# Patient Record
Sex: FEMALE | Race: WHITE | URBAN - METROPOLITAN AREA
[De-identification: names, ages, dates, MRNs, and addresses within clinical notes are randomized per-mention and may not be internally consistent; named-entity substitution may affect disease eponyms.]

---

## 2019-05-26 ENCOUNTER — EMERGENCY (EMERGENCY)
Facility: HOSPITAL | Age: 39
LOS: 1 days | End: 2019-05-26
Admitting: EMERGENCY MEDICINE
Payer: SELF-PAY

## 2019-05-26 PROCEDURE — 99284 EMERGENCY DEPT VISIT MOD MDM: CPT | Mod: 25

## 2019-05-26 PROCEDURE — 99053 MED SERV 10PM-8AM 24 HR FAC: CPT

## 2019-05-27 PROCEDURE — 76815 OB US LIMITED FETUS(S): CPT | Mod: 26

## 2019-05-27 PROCEDURE — 76817 TRANSVAGINAL US OBSTETRIC: CPT | Mod: 26

## 2020-05-20 PROBLEM — Z00.00 ENCOUNTER FOR PREVENTIVE HEALTH EXAMINATION: Status: ACTIVE | Noted: 2020-05-20

## 2020-05-21 ENCOUNTER — APPOINTMENT (OUTPATIENT)
Dept: ORTHOPEDIC SURGERY | Facility: CLINIC | Age: 40
End: 2020-05-21
Payer: MEDICAID

## 2020-05-21 VITALS — WEIGHT: 122 LBS | HEIGHT: 59.45 IN | BODY MASS INDEX: 24.27 KG/M2

## 2020-05-21 VITALS — DIASTOLIC BLOOD PRESSURE: 72 MMHG | SYSTOLIC BLOOD PRESSURE: 104 MMHG | HEART RATE: 74 BPM

## 2020-05-21 DIAGNOSIS — Z78.9 OTHER SPECIFIED HEALTH STATUS: ICD-10-CM

## 2020-05-21 PROCEDURE — 99204 OFFICE O/P NEW MOD 45 MIN: CPT

## 2020-05-21 NOTE — PHYSICAL EXAM
[de-identified] : - Constitutional: This is a female in no obvious distress.  \par - Psych: Patient is alert and oriented to person, place and time.  Patient has a normal mood and affect.\par - Cardiovascular: Normal pulses throughout the upper extremities.  No significant varicosities are noted in the upper extremities. \par - Neuro: Strength and sensation are intact throughout the upper extremities.  Patient has normal coordination.\par - Respiratory:  Patient exhibits no evidence of shortness of breath or difficulty breathing.\par - Skin: No rashes, lesions, or other abnormalities are noted in the upper extremities.\par \par ---\par \par Examination of her right wrist demonstrates swelling and tenderness along the first dorsal compartment.  There is evidence of depigmentation and atrophy consistent with her prior cortisone injection.  She has a positive Finkelstein sign.  There is no tenderness along the CMC joint.  She is neurovascularly intact distally.

## 2020-05-21 NOTE — CONSULT LETTER
[Dear  ___] : Dear  [unfilled], [Please see my note below.] : Please see my note below. [Consult Letter:] : I had the pleasure of evaluating your patient, [unfilled]. [Consult Closing:] : Thank you very much for allowing me to participate in the care of this patient.  If you have any questions, please do not hesitate to contact me. [Sincerely,] : Sincerely, [FreeTextEntry1] : Luis Enrique Lentz M.D.\par Surgery of the Hand & Upper Extremity\par Orthopaedic Surgery\par Chief, Hand Service, [facility]\par Director, Hand Service, [facility]\par  of Orthopedic Surgery, [employer] [facility] \par [facility]\par \par Email: [email or web address]\par Office Phone: [phone]

## 2020-05-21 NOTE — ADDENDUM
[FreeTextEntry1] : I, Jb Medrano, acted solely as a scribe for Dr. Lentz on this date 05/21/2020.\par

## 2020-05-21 NOTE — END OF VISIT
[FreeTextEntry3] : All medical record entries made by the Scribe were at my, Dr. Lentz, direction and personally dictated by me on 05/21/2020. I have reviewed the chart and agree that the record accurately reflects my personal performances of the history, physical exam, assessment, and plan. I have also personally directed, reviewed, and agreed with the chart.\par

## 2020-05-21 NOTE — DISCUSSION/SUMMARY
[FreeTextEntry1] : She has findings consistent with right wrist pain secondary to de Quervain's tenosynovitis.  She had a prior cortisone injections by another physician 2 months ago without relief.\par \par I had a discussion regarding today's visit, the diagnosis, and treatment recommendations / options. We discussed either a repeat cortisone injection for surgical intervention.  She is concerned about a repeat cortisone injection.  She would like to try a course of physical therapy.  She was given an appropriate referral.  She will follow-up in 4 weeks to assess her progress.\par \par The patient has agreed to this plan of management and has expressed full understanding.  All questions were fully answered to the patient's satisfaction.\par \par Over 50% of the time spent with the patient was on counseling the patient on the above diagnosis, treatment plan and prognosis.\par

## 2020-05-21 NOTE — HISTORY OF PRESENT ILLNESS
[Right] : right hand dominant [FreeTextEntry1] : She comes in today for evaluation of right hand and wrist pain, which began 6 months ago. She denies an injury. She states she had an injection approximately 2 months ago by another physician.  She only noted short-term relief.  She rates her pain a 4 out of 10. \par \par She was referred by Dr. Thompson.

## 2020-06-15 ENCOUNTER — APPOINTMENT (OUTPATIENT)
Dept: ORTHOPEDIC SURGERY | Facility: CLINIC | Age: 40
End: 2020-06-15
Payer: MEDICAID

## 2020-06-15 PROCEDURE — 99213 OFFICE O/P EST LOW 20 MIN: CPT

## 2020-06-15 NOTE — DISCUSSION/SUMMARY
[FreeTextEntry1] : I had a discussion regarding today's visit, the diagnosis and treatment recommendations / options.  I would not recommend a repeat cortisone injection, given her depigmentation and skin atrophy from her prior cortisone injection.  She will continue therapy and observation.  She will follow-up in 4 weeks.  If she is not further improved, then we will discuss possible surgical release.\par \par The patient has agreed to the above plan of management and has expressed full understanding.  All questions were fully answered to the patient's satisfaction.\par \par I spent at least 25 minutes of face-to-face time with the patient.  Over 50% of this time was spent on counseling the patient on the above diagnosis, treatment plan and prognosis.

## 2020-06-15 NOTE — HISTORY OF PRESENT ILLNESS
[FreeTextEntry1] : Follow-up regarding right wrist pain secondary to de Quervain's tendinitis.  See note from when she was seen in the office 15 days ago.  She was previously given a cortisone injection by another physician.  She deferred a repeat injection and asked for a referral to therapy.\par \par She returns today in follow-up.  She is somewhat improved with the therapy but still has symptoms.  She is also concerned about the skin discoloration from her prior injection.\par \par She was accompanied by a friend today who helped in translation from Bulgarian.

## 2020-07-10 PROBLEM — M65.4 DE QUERVAIN'S TENOSYNOVITIS, RIGHT: Status: ACTIVE | Noted: 2020-05-21

## 2020-07-13 ENCOUNTER — APPOINTMENT (OUTPATIENT)
Dept: ORTHOPEDIC SURGERY | Facility: CLINIC | Age: 40
End: 2020-07-13
Payer: MEDICAID

## 2020-07-13 DIAGNOSIS — M65.4 RADIAL STYLOID TENOSYNOVITIS [DE QUERVAIN]: ICD-10-CM

## 2020-07-13 PROCEDURE — 99214 OFFICE O/P EST MOD 30 MIN: CPT

## 2020-07-13 NOTE — DISCUSSION/SUMMARY
[FreeTextEntry1] : I had a discussion regarding today's visit, the diagnosis and treatment recommendations / options.  At this time, given her residual symptoms, and symptoms in other areas, I have ordered an MRI of her right wrist.  Given her depigmentation, I would not recommend a repeat cortisone injection.  We discussed possible surgical release but I am not certain that this is necessary.  I would like to first review the MRI and then she will follow-up to discuss further treatment recommendations.\par \par The patient has agreed to the above plan of management and has expressed full understanding.  All questions were fully answered to the patient's satisfaction.\par \par I spent at least 25 minutes of face-to-face time with the patient.  Over 50% of this time was spent on counseling the patient on the above diagnosis, treatment plan and prognosis.

## 2020-07-13 NOTE — END OF VISIT
[FreeTextEntry3] : All medical record entries made by the Scribe were at my, Dr. Lentz, direction and personally dictated by me on 07/13/2020. I have reviewed the chart and agree that the record accurately reflects my personal performances of the history, physical exam, assessment, and plan. I have also personally directed, reviewed, and agreed with the chart.\par \par

## 2020-07-13 NOTE — ADDENDUM
[FreeTextEntry1] : I, Vane Martinez, acted solely as a scribe for Dr. Lentz on this date 07/13/2020.\par \par

## 2020-07-13 NOTE — HISTORY OF PRESENT ILLNESS
[FreeTextEntry1] : Follow-up regarding right wrist pain secondary to de Quervain's tendinitis.  See note from when she was seen in the office 4 weeks ago.  She was referred to therapy.  She was previously given an injection by another physician with some improvement but significant skin depigmentation.\par \par She is doing well, however, she has mild relief with pain. She states that therapy is helpful, but her pain begins to hurt once she is active. \par \par She was accompanied by her son today who helped in translation from [place]ian.

## 2020-07-13 NOTE — PHYSICAL EXAM
[de-identified] : - Constitutional: This is a female in no obvious distress.  She was accompanied by her son today who helped in translation from Sinhala.\par - Psych: Patient is alert and oriented to person, place and time.  Patient has a normal mood and affect.\par - Cardiovascular: Normal pulses throughout the upper extremities.  No significant varicosities are noted in the upper extremities. \par - Neuro: Strength and sensation are intact throughout the upper extremities.  Patient has normal coordination.\par - Respiratory:  Patient exhibits no evidence of shortness of breath or difficulty breathing.\par - Skin: No rashes, lesions, or other abnormalities are noted in the upper extremities.\par \par ---\par \par Examination of her right wrist demonstrates mild residual tenderness along the first dorsal compartment.  There is no obvious swelling.  There is evidence of depigmentation and atrophy consistent with her prior cortisone injection.  She has an equivocal Finkelstein sign.  There is no tenderness along the CMC joint.  She is neurovascularly intact distally.  However, she does have complaints of intermittent tingling along the dorsal ulnar sensory nerve branch at the dorsum of the thumb.